# Patient Record
Sex: FEMALE | Race: BLACK OR AFRICAN AMERICAN | NOT HISPANIC OR LATINO | Employment: FULL TIME | URBAN - METROPOLITAN AREA
[De-identification: names, ages, dates, MRNs, and addresses within clinical notes are randomized per-mention and may not be internally consistent; named-entity substitution may affect disease eponyms.]

---

## 2022-04-22 ENCOUNTER — APPOINTMENT (OUTPATIENT)
Dept: RADIOLOGY | Facility: CLINIC | Age: 27
End: 2022-04-22
Payer: COMMERCIAL

## 2022-04-22 ENCOUNTER — HOSPITAL ENCOUNTER (EMERGENCY)
Facility: CLINIC | Age: 27
Discharge: HOME | End: 2022-04-22
Admitting: EMERGENCY MEDICINE
Payer: COMMERCIAL

## 2022-04-22 VITALS
TEMPERATURE: 98.9 F | DIASTOLIC BLOOD PRESSURE: 60 MMHG | HEART RATE: 61 BPM | HEIGHT: 69 IN | RESPIRATION RATE: 17 BRPM | OXYGEN SATURATION: 99 % | SYSTOLIC BLOOD PRESSURE: 127 MMHG | BODY MASS INDEX: 31.1 KG/M2 | WEIGHT: 210 LBS

## 2022-04-22 DIAGNOSIS — R07.89 CHEST PRESSURE: ICD-10-CM

## 2022-04-22 LAB
ANION GAP SERPL CALCULATED.3IONS-SCNC: 12 MMOL/L (ref 5–18)
ATRIAL RATE - MUSE: 64 BPM
BASOPHILS # BLD AUTO: 0 10E3/UL (ref 0–0.2)
BASOPHILS NFR BLD AUTO: 0 %
BUN SERPL-MCNC: 10 MG/DL (ref 8–22)
CALCIUM SERPL-MCNC: 9.4 MG/DL (ref 8.5–10.5)
CHLORIDE BLD-SCNC: 106 MMOL/L (ref 98–107)
CO2 SERPL-SCNC: 25 MMOL/L (ref 22–31)
CREAT SERPL-MCNC: 0.75 MG/DL (ref 0.6–1.1)
DIASTOLIC BLOOD PRESSURE - MUSE: NORMAL MMHG
EOSINOPHIL # BLD AUTO: 0.1 10E3/UL (ref 0–0.7)
EOSINOPHIL NFR BLD AUTO: 1 %
ERYTHROCYTE [DISTWIDTH] IN BLOOD BY AUTOMATED COUNT: 11.5 % (ref 10–15)
GFR SERPL CREATININE-BSD FRML MDRD: >90 ML/MIN/1.73M2
GLUCOSE BLD-MCNC: 88 MG/DL (ref 70–125)
HCG UR QL: NEGATIVE
HCT VFR BLD AUTO: 41.5 % (ref 35–47)
HGB BLD-MCNC: 14.3 G/DL (ref 11.7–15.7)
IMM GRANULOCYTES # BLD: 0 10E3/UL
IMM GRANULOCYTES NFR BLD: 0 %
INTERPRETATION ECG - MUSE: NORMAL
LYMPHOCYTES # BLD AUTO: 2.2 10E3/UL (ref 0.8–5.3)
LYMPHOCYTES NFR BLD AUTO: 25 %
MAGNESIUM SERPL-MCNC: 1.8 MG/DL (ref 1.8–2.6)
MCH RBC QN AUTO: 30.1 PG (ref 26.5–33)
MCHC RBC AUTO-ENTMCNC: 34.5 G/DL (ref 31.5–36.5)
MCV RBC AUTO: 87 FL (ref 78–100)
MONOCYTES # BLD AUTO: 0.6 10E3/UL (ref 0–1.3)
MONOCYTES NFR BLD AUTO: 6 %
NEUTROPHILS # BLD AUTO: 6.1 10E3/UL (ref 1.6–8.3)
NEUTROPHILS NFR BLD AUTO: 68 %
NRBC # BLD AUTO: 0 10E3/UL
NRBC BLD AUTO-RTO: 0 /100
P AXIS - MUSE: 20 DEGREES
PLATELET # BLD AUTO: 373 10E3/UL (ref 150–450)
POTASSIUM BLD-SCNC: 4.2 MMOL/L (ref 3.5–5)
PR INTERVAL - MUSE: 140 MS
QRS DURATION - MUSE: 92 MS
QT - MUSE: 408 MS
QTC - MUSE: 420 MS
R AXIS - MUSE: 20 DEGREES
RBC # BLD AUTO: 4.75 10E6/UL (ref 3.8–5.2)
SODIUM SERPL-SCNC: 143 MMOL/L (ref 136–145)
SYSTOLIC BLOOD PRESSURE - MUSE: NORMAL MMHG
T AXIS - MUSE: 32 DEGREES
TROPONIN I SERPL-MCNC: <0.01 NG/ML (ref 0–0.29)
TSH SERPL DL<=0.005 MIU/L-ACNC: 0.87 UIU/ML (ref 0.3–5)
VENTRICULAR RATE- MUSE: 64 BPM
WBC # BLD AUTO: 9.1 10E3/UL (ref 4–11)

## 2022-04-22 PROCEDURE — 80048 BASIC METABOLIC PNL TOTAL CA: CPT | Performed by: EMERGENCY MEDICINE

## 2022-04-22 PROCEDURE — 84484 ASSAY OF TROPONIN QUANT: CPT | Performed by: EMERGENCY MEDICINE

## 2022-04-22 PROCEDURE — 84443 ASSAY THYROID STIM HORMONE: CPT | Performed by: EMERGENCY MEDICINE

## 2022-04-22 PROCEDURE — 83735 ASSAY OF MAGNESIUM: CPT | Performed by: EMERGENCY MEDICINE

## 2022-04-22 PROCEDURE — 85025 COMPLETE CBC W/AUTO DIFF WBC: CPT | Performed by: EMERGENCY MEDICINE

## 2022-04-22 PROCEDURE — 99285 EMERGENCY DEPT VISIT HI MDM: CPT | Mod: 25

## 2022-04-22 PROCEDURE — 71046 X-RAY EXAM CHEST 2 VIEWS: CPT

## 2022-04-22 PROCEDURE — 36415 COLL VENOUS BLD VENIPUNCTURE: CPT | Performed by: EMERGENCY MEDICINE

## 2022-04-22 PROCEDURE — 93005 ELECTROCARDIOGRAM TRACING: CPT | Performed by: EMERGENCY MEDICINE

## 2022-04-22 PROCEDURE — 81025 URINE PREGNANCY TEST: CPT | Performed by: EMERGENCY MEDICINE

## 2022-04-22 ASSESSMENT — ENCOUNTER SYMPTOMS
VOMITING: 0
CHILLS: 0
CHEST TIGHTNESS: 1
SHORTNESS OF BREATH: 1
LIGHT-HEADEDNESS: 1
COUGH: 0
FEVER: 0
ABDOMINAL PAIN: 0
NAUSEA: 0

## 2022-04-22 NOTE — ED TRIAGE NOTES
"Arrives to ED with c/o chest pressure that began at 2200 last night. Reports heart rate \"jumping around\" between 50s and 120s. Reports mild SOB. Denies cardiac hx. +nausea. Denies emesis.   "

## 2022-04-22 NOTE — ED PROVIDER NOTES
EMERGENCY DEPARTMENT ENCOUNTER      NAME: Naila Bill  AGE: 26 year old female  YOB: 1995  MRN: [id number]  EVALUATION DATE & TIME: 4/22/2022  4:26 PM    PCP: No Ref-Primary, Physician    ED PROVIDER: Juli Kauffman PA-C      Chief Complaint   Patient presents with     Chest Pain         FINAL IMPRESSION:  1. Chest pressure          ED COURSE & MEDICAL DECISION MAKING:    Pertinent Labs & Imaging studies reviewed. (See chart for details)    26 year old female presents to the Emergency Department for evaluation of chest pain.    Physical exam is remarkable for well-appearing female who is in no acute distress.  Heart and lung sounds are clear diffusely throughout.  Abdomen is soft and nontender.  Vital signs are stable and she is afebrile.    CBC is unremarkable with no leukocytosis or anemia.  BMP is unremarkable with no significant electrolyte derangements, normal kidney function.  Magnesium is within normal limits.  TSH within normal limits.  Troponin is negative, EKG without acute ischemic changes.  Pregnancy test is negative.  Chest x-ray is normal.    I do not think any further emergent labs or imaging are indicated at this time.  The patient has been having constant chest pain since yesterday so I think a single troponin and nonischemic EKG is adequate to rule out ACS in patient with no cardiac risk factors.  She is PERC negative so I do not think evaluation for PE is indicated.  She has no ripping or tearing pain to suggest dissection.  The cause of her chest pain is not clear at this time, recommend follow-up with primary care.  She does not have a PCP established so I placed a referral.  Advised return here for any new or worsening symptoms.  The patient is agreeable with this treatment plan and verbalized her understanding.    ED Course   5:19 PM Performed my initial history and physical exam. Discussed workup in the emergency department, management of symptoms, and likely disposition.    6:50 PM Updated with test results. I discussed the plan for discharge with the patient, and patient is agreeable. We discussed supportive cares at home and reasons for return to the ER including new or worsening symptoms - all questions and concerns addressed. Patient to be discharged by RN.    At the conclusion of the encounter I discussed the results of all of the tests and the disposition. The questions were answered. The patient or family acknowledged understanding and was agreeable with the care plan.     Voice recognition software was used in the creation of this note. Any grammatical or nonsensical errors are due to inherent errors with the software and are not the intention of the writer.     MEDICATIONS GIVEN IN THE EMERGENCY:  Medications - No data to display    NEW PRESCRIPTIONS STARTED AT TODAY'S ER VISIT  Discharge Medication List as of 4/22/2022  7:03 PM               =================================================================    HPI    Patient information was obtained from: Patient    Use of : N/A        Naila Bill is a 26 year old female who presents to the ED via walk-in with  for evaluation of chest pain.    The patient states that since last night, she has had a constant chest pressure in the center of her chest.  She does not identify any aggravating or alleviating factors.  At times, she states it is difficult to get a full/deep breath.  She has never had symptoms like this before.  She also endorses lightheadedness.  She denies any personal cardiac history but states her father does have a history of heart attack.  She has no personal history of DVT or PE, no estrogen-containing medications.  Her and her  are actively trying to get pregnant and they believe they may have suffered a miscarriage 2 weeks ago but never took a pregnancy test.  She denies any nausea, vomiting, syncope, fevers, chills, cough, or abdominal pain. She does have a history of anxiety but  "states these symptoms are not typical of her usual anxiety symptoms.      REVIEW OF SYSTEMS   Review of Systems   Constitutional: Negative for chills and fever.   Respiratory: Positive for chest tightness and shortness of breath. Negative for cough.    Cardiovascular: Positive for chest pain.   Gastrointestinal: Negative for abdominal pain, nausea and vomiting.   Neurological: Positive for light-headedness. Negative for syncope.     All other systems reviewed and are negative unless noted in HPI.      PAST MEDICAL HISTORY:  No past medical history on file.    PAST SURGICAL HISTORY:  No past surgical history on file.    CURRENT MEDICATIONS:    UNKNOWN TO PATIENT        ALLERGIES:  Allergies   Allergen Reactions     Bee Pollen Anaphylaxis       FAMILY HISTORY:  No family history on file.    SOCIAL HISTORY:   Social History     Socioeconomic History     Marital status: Single   Tobacco Use     Smoking status: Never Smoker   Substance and Sexual Activity     Alcohol use: No     Comment: 8/9/13 hf      Drug use: No     Comment: 8/9/13 hf      Sexual activity: Never     Partners: Male     Comment: 8/9/13 hf        VITALS:  Patient Vitals for the past 24 hrs:   BP Temp Temp src Pulse Resp SpO2 Height Weight   04/22/22 1900 127/60 -- -- 61 -- 99 % -- --   04/22/22 1845 (!) 147/69 -- -- 76 -- 98 % -- --   04/22/22 1745 (!) 145/75 -- -- 71 17 98 % -- --   04/22/22 1740 (!) 140/87 -- -- 72 16 99 % -- --   04/22/22 1624 (!) 164/95 98.9  F (37.2  C) Temporal 83 16 98 % 1.753 m (5' 9\") 95.3 kg (210 lb)       PHYSICAL EXAM    VITAL SIGNS: /60   Pulse 61   Temp 98.9  F (37.2  C) (Temporal)   Resp 17   Ht 1.753 m (5' 9\")   Wt 95.3 kg (210 lb)   LMP 04/10/2022   SpO2 99%   BMI 31.01 kg/m    General Appearance: Alert, cooperative, normal speech and facial symmetry, appears stated age, the patient does not appear in distress  Head:  Normocephalic, without obvious abnormality, atraumatic  Eyes: Conjunctiva/corneas clear, " EOM's intact, no nystagmus, PERRL  ENT:  Lips, mucosa, and tongue normal; teeth and gums normal, no pharyngeal inflammation, no dysphonia or difficulty swallowing, membranes are moist without pallor  Chest:  No tenderness or deformity  Cardio:  Regular rate and rhythm, S1 and S2 normal, no murmur, rub    or gallop, 2+ pulses symmetric in all extremities  Pulm:  Clear to auscultation bilaterally, respirations unlabored with no accessory muscle use  Abdomen:  Abdomen is soft, non-distended with no tenderness to palpation, rebound tenderness, or guarding.   Extremities: No calf tenderness; Extremities normal, there is no tenderness to palpation , atraumatic, no cyanosis or edema, full function and range of motion, pulses equal in all extremities, normal cap refill, no joint swelling  Neuro: Patient is awake, alert, and responsive to voice. No gross motor weaknesses or sensory loss; moves all extremities.     LAB:  All pertinent labs reviewed and interpreted.  Labs Ordered and Resulted from Time of ED Arrival to Time of ED Departure   BASIC METABOLIC PANEL - Normal       Result Value    Sodium 143      Potassium 4.2      Chloride 106      Carbon Dioxide (CO2) 25      Anion Gap 12      Urea Nitrogen 10      Creatinine 0.75      Calcium 9.4      Glucose 88      GFR Estimate >90     TROPONIN I - Normal    Troponin I <0.01     MAGNESIUM - Normal    Magnesium 1.8     TSH WITH FREE T4 REFLEX - Normal    TSH 0.87     HCG QUALITATIVE URINE - Normal    hCG Urine Qualitative Negative     CBC WITH PLATELETS AND DIFFERENTIAL    WBC Count 9.1      RBC Count 4.75      Hemoglobin 14.3      Hematocrit 41.5      MCV 87      MCH 30.1      MCHC 34.5      RDW 11.5      Platelet Count 373      % Neutrophils 68      % Lymphocytes 25      % Monocytes 6      % Eosinophils 1      % Basophils 0      % Immature Granulocytes 0      NRBCs per 100 WBC 0      Absolute Neutrophils 6.1      Absolute Lymphocytes 2.2      Absolute Monocytes 0.6       Absolute Eosinophils 0.1      Absolute Basophils 0.0      Absolute Immature Granulocytes 0.0      Absolute NRBCs 0.0         RADIOLOGY:  Reviewed all pertinent imaging. Please see official radiology report.  XR Chest 2 Views   Final Result   IMPRESSION: Lungs are clear. No pleural effusion. Heart size and pulmonary vascularity within normal limits.          EKG:    Performed at: 18:07    I have independently reviewed and interpreted the EKG, along with the final read. EKG also reviewed by Dr. Everett.    Ventricular rate 64 bpm  DE interval 140 ms  QRS duration 92 ms  QT/QTc 408/420 ms  P-R-T axes 20 20 32    Impression: NSR    Juli Kauffman PA-C  Emergency Medicine  [facility] EMERGENCY ROOM  [address] [facility] [address] [phone]  Dept: [phone]       Juli Kauffman PA-C  04/22/22 1918

## 2022-04-23 NOTE — DISCHARGE INSTRUCTIONS
You are seen in the emergency department today for evaluation of chest pain.  Your lab work today is reassuring with no evidence of infection in your blood, electrolyte problems, heart attack, or thyroid dysfunction.  Your chest x-ray is normal.    You may take Tylenol and ibuprofen for pain/fever, do not exceed 4000 mg of Tylenol per day or 3200 mg ofibuprofen per day.    I placed a referral to primary care for you, please follow-up with them regarding your symptoms.  Return here for any new or worsening symptoms like worsening chest pain, difficulty breathing, coughing up blood, passing out, or any other symptoms that concern you.

## 2022-07-10 ENCOUNTER — HEALTH MAINTENANCE LETTER (OUTPATIENT)
Age: 27
End: 2022-07-10

## 2022-09-10 ENCOUNTER — HEALTH MAINTENANCE LETTER (OUTPATIENT)
Age: 27
End: 2022-09-10

## 2023-04-20 ENCOUNTER — OFFICE VISIT (OUTPATIENT)
Dept: URGENT CARE | Facility: URGENT CARE | Age: 28
End: 2023-04-20
Payer: COMMERCIAL

## 2023-04-20 VITALS
RESPIRATION RATE: 18 BRPM | WEIGHT: 246 LBS | DIASTOLIC BLOOD PRESSURE: 74 MMHG | SYSTOLIC BLOOD PRESSURE: 110 MMHG | HEART RATE: 73 BPM | OXYGEN SATURATION: 98 % | HEIGHT: 69 IN | BODY MASS INDEX: 36.43 KG/M2 | TEMPERATURE: 97.3 F

## 2023-04-20 PROCEDURE — 99202 OFFICE O/P NEW SF 15 MIN: CPT | Performed by: PHYSICIAN ASSISTANT

## 2023-04-20 NOTE — PROGRESS NOTES
"  Assessment & Plan:        ICD-10-CM    1.  wound disruption  O90.0             Plan/Clinical Decision Making:    Patient with well healed  scar from December with recent superficial 1/2 cm opening at  scar.   Used steri-strips to bandage and can continue to help with healing.       Return if symptoms worsen or fail to improve, for in 7-10 days.     At the end of the encounter, I discussed results, diagnosis, medications. Discussed red flags for immediate return to clinic/ER, as well as indications for follow up if no improvement. Patient understood and agreed to plan. Patient was stable for discharge.        Magda Valdovinos PA-C on 2023 at 6:57 PM          Subjective:     HPI:    Naila is a 27 year old female who presents to clinic today for the following health issues:  Chief Complaint   Patient presents with     Urgent Care     Derm Problem     Hole in  scar since yesterday. Had  back in December, has been coming and going since then.      HPI    Patient noticed small opening of her  scar.   Very slight drainage. No purulent drainage, no surrounding redness.   Has lost weight since , no weight gain.     History obtained from the patient.    Review of Systems   Constitutional: Negative for fever.   Skin: Positive for wound.       Patient Active Problem List   Diagnosis     MENTAL HEALTH        No past medical history on file.    Social History     Tobacco Use     Smoking status: Never     Smokeless tobacco: Not on file   Vaping Use     Vaping status: Not on file   Substance Use Topics     Alcohol use: No     Comment: 13 hf              Objective:     Vitals:    23 1828   BP: 110/74   Pulse: 73   Resp: 18   Temp: 97.3  F (36.3  C)   TempSrc: Temporal   SpO2: 98%   Weight: 111.6 kg (246 lb)   Height: 1.753 m (5' 9\")         Physical Exam   EXAM:   Pleasant, alert, appropriate appearance. NAD.  Head Exam: Normocephalic, atraumatic.  ABD: " soft, Non-tender,  scar well healed. Left aspect of  scar has small area of opening. Superficial. Slight weeping, but no purulent drainage.       Results:  No results found for any visits on 23.

## 2023-04-26 ASSESSMENT — ENCOUNTER SYMPTOMS
WOUND: 1
FEVER: 0

## 2023-07-23 ENCOUNTER — HEALTH MAINTENANCE LETTER (OUTPATIENT)
Age: 28
End: 2023-07-23

## 2023-10-18 ENCOUNTER — HOSPITAL ENCOUNTER (EMERGENCY)
Facility: CLINIC | Age: 28
Discharge: HOME | End: 2023-10-18
Admitting: EMERGENCY MEDICINE
Payer: COMMERCIAL

## 2023-10-18 ENCOUNTER — APPOINTMENT (OUTPATIENT)
Dept: CT IMAGING | Facility: CLINIC | Age: 28
End: 2023-10-18
Payer: COMMERCIAL

## 2023-10-18 VITALS
WEIGHT: 240 LBS | BODY MASS INDEX: 35.55 KG/M2 | HEIGHT: 69 IN | TEMPERATURE: 98.4 F | RESPIRATION RATE: 20 BRPM | SYSTOLIC BLOOD PRESSURE: 133 MMHG | OXYGEN SATURATION: 97 % | DIASTOLIC BLOOD PRESSURE: 68 MMHG | HEART RATE: 86 BPM

## 2023-10-18 DIAGNOSIS — J06.9 VIRAL URI WITH COUGH: ICD-10-CM

## 2023-10-18 DIAGNOSIS — R06.02 SHORTNESS OF BREATH: ICD-10-CM

## 2023-10-18 DIAGNOSIS — R91.8 GROUND GLASS OPACITY PRESENT ON IMAGING OF LUNG: ICD-10-CM

## 2023-10-18 LAB
ANION GAP SERPL CALCULATED.3IONS-SCNC: 8 MMOL/L (ref 7–15)
ATRIAL RATE - MUSE: 88 BPM
BUN SERPL-MCNC: 7.4 MG/DL (ref 6–20)
CALCIUM SERPL-MCNC: 9.9 MG/DL (ref 8.6–10)
CHLORIDE SERPL-SCNC: 107 MMOL/L (ref 98–107)
CREAT SERPL-MCNC: 0.78 MG/DL (ref 0.51–0.95)
D DIMER PPP FEU-MCNC: 0.51 UG/ML FEU (ref 0–0.5)
DEPRECATED HCO3 PLAS-SCNC: 24 MMOL/L (ref 22–29)
DIASTOLIC BLOOD PRESSURE - MUSE: NORMAL MMHG
EGFRCR SERPLBLD CKD-EPI 2021: >90 ML/MIN/1.73M2
ERYTHROCYTE [DISTWIDTH] IN BLOOD BY AUTOMATED COUNT: 12.6 % (ref 10–15)
FLUAV RNA SPEC QL NAA+PROBE: NEGATIVE
FLUBV RNA RESP QL NAA+PROBE: NEGATIVE
GLUCOSE SERPL-MCNC: 99 MG/DL (ref 70–99)
HCG SERPL QL: NEGATIVE
HCT VFR BLD AUTO: 40 % (ref 35–47)
HGB BLD-MCNC: 13.6 G/DL (ref 11.7–15.7)
HOLD SPECIMEN: NORMAL
INTERPRETATION ECG - MUSE: NORMAL
LACTATE SERPL-SCNC: 1.2 MMOL/L (ref 0.7–2)
MCH RBC QN AUTO: 30 PG (ref 26.5–33)
MCHC RBC AUTO-ENTMCNC: 34 G/DL (ref 31.5–36.5)
MCV RBC AUTO: 88 FL (ref 78–100)
NT-PROBNP SERPL-MCNC: <36 PG/ML (ref 0–450)
P AXIS - MUSE: 24 DEGREES
PLATELET # BLD AUTO: 311 10E3/UL (ref 150–450)
POTASSIUM SERPL-SCNC: 4 MMOL/L (ref 3.4–5.3)
PR INTERVAL - MUSE: 130 MS
PROCALCITONIN SERPL IA-MCNC: 0.06 NG/ML
QRS DURATION - MUSE: 86 MS
QT - MUSE: 362 MS
QTC - MUSE: 438 MS
R AXIS - MUSE: -4 DEGREES
RBC # BLD AUTO: 4.53 10E6/UL (ref 3.8–5.2)
RSV RNA SPEC NAA+PROBE: NEGATIVE
SARS-COV-2 RNA RESP QL NAA+PROBE: NEGATIVE
SODIUM SERPL-SCNC: 139 MMOL/L (ref 135–145)
SYSTOLIC BLOOD PRESSURE - MUSE: NORMAL MMHG
T AXIS - MUSE: 30 DEGREES
TROPONIN T SERPL HS-MCNC: <6 NG/L
VENTRICULAR RATE- MUSE: 88 BPM
WBC # BLD AUTO: 8.2 10E3/UL (ref 4–11)

## 2023-10-18 PROCEDURE — 83605 ASSAY OF LACTIC ACID: CPT

## 2023-10-18 PROCEDURE — 250N000011 HC RX IP 250 OP 636: Mod: JZ

## 2023-10-18 PROCEDURE — 258N000003 HC RX IP 258 OP 636

## 2023-10-18 PROCEDURE — 93005 ELECTROCARDIOGRAM TRACING: CPT

## 2023-10-18 PROCEDURE — 85027 COMPLETE CBC AUTOMATED: CPT

## 2023-10-18 PROCEDURE — 84145 PROCALCITONIN (PCT): CPT

## 2023-10-18 PROCEDURE — 83880 ASSAY OF NATRIURETIC PEPTIDE: CPT

## 2023-10-18 PROCEDURE — 80048 BASIC METABOLIC PNL TOTAL CA: CPT

## 2023-10-18 PROCEDURE — 36415 COLL VENOUS BLD VENIPUNCTURE: CPT

## 2023-10-18 PROCEDURE — 84484 ASSAY OF TROPONIN QUANT: CPT

## 2023-10-18 PROCEDURE — 84703 CHORIONIC GONADOTROPIN ASSAY: CPT

## 2023-10-18 PROCEDURE — 99285 EMERGENCY DEPT VISIT HI MDM: CPT | Mod: 25

## 2023-10-18 PROCEDURE — 87637 SARSCOV2&INF A&B&RSV AMP PRB: CPT

## 2023-10-18 PROCEDURE — 85379 FIBRIN DEGRADATION QUANT: CPT

## 2023-10-18 PROCEDURE — 96374 THER/PROPH/DIAG INJ IV PUSH: CPT | Mod: 59

## 2023-10-18 PROCEDURE — 71275 CT ANGIOGRAPHY CHEST: CPT

## 2023-10-18 PROCEDURE — 96361 HYDRATE IV INFUSION ADD-ON: CPT

## 2023-10-18 RX ORDER — BENZONATATE 100 MG/1
100 CAPSULE ORAL 3 TIMES DAILY PRN
Qty: 30 CAPSULE | Refills: 0 | Status: SHIPPED | OUTPATIENT
Start: 2023-10-18 | End: 2024-06-10

## 2023-10-18 RX ORDER — METHYLPREDNISOLONE 4 MG
TABLET, DOSE PACK ORAL
Qty: 21 TABLET | Refills: 0 | Status: SHIPPED | OUTPATIENT
Start: 2023-10-18 | End: 2024-06-10

## 2023-10-18 RX ORDER — IOPAMIDOL 755 MG/ML
75 INJECTION, SOLUTION INTRAVASCULAR ONCE
Status: COMPLETED | OUTPATIENT
Start: 2023-10-18 | End: 2023-10-18

## 2023-10-18 RX ORDER — METHYLPREDNISOLONE SODIUM SUCCINATE 125 MG/2ML
125 INJECTION, POWDER, LYOPHILIZED, FOR SOLUTION INTRAMUSCULAR; INTRAVENOUS ONCE
Status: COMPLETED | OUTPATIENT
Start: 2023-10-18 | End: 2023-10-18

## 2023-10-18 RX ADMIN — SODIUM CHLORIDE 1000 ML: 9 INJECTION, SOLUTION INTRAVENOUS at 10:10

## 2023-10-18 RX ADMIN — METHYLPREDNISOLONE SODIUM SUCCINATE 125 MG: 125 INJECTION, POWDER, FOR SOLUTION INTRAMUSCULAR; INTRAVENOUS at 10:53

## 2023-10-18 RX ADMIN — IOPAMIDOL 75 ML: 755 INJECTION, SOLUTION INTRAVENOUS at 11:53

## 2023-10-18 ASSESSMENT — ENCOUNTER SYMPTOMS
NAUSEA: 1
CONSTIPATION: 0
LIGHT-HEADEDNESS: 1
NUMBNESS: 0
FATIGUE: 1
DYSURIA: 0
CHEST TIGHTNESS: 1
CHILLS: 1
WEAKNESS: 0
SORE THROAT: 0
COUGH: 1
DIARRHEA: 0
DIZZINESS: 1
FEVER: 1
BACK PAIN: 0
VOMITING: 1
SHORTNESS OF BREATH: 1
HEADACHES: 1
ABDOMINAL PAIN: 0

## 2023-10-18 ASSESSMENT — ACTIVITIES OF DAILY LIVING (ADL)
ADLS_ACUITY_SCORE: 35
ADLS_ACUITY_SCORE: 35

## 2023-10-18 NOTE — ED PROVIDER NOTES
EMERGENCY DEPARTMENT ENCOUNTER      NAME: Naila Wilson  AGE: 27 year old female  YOB: 1995  MRN: [id number]  EVALUATION DATE & TIME: No admission date for patient encounter.    PCP: No Ref-Primary, Physician    ED PROVIDER: Jeni Berumen PA-C    Chief Complaint   Patient presents with    Cough    Dizziness     FINAL IMPRESSION:  1. Shortness of breath    2. Viral URI with cough    3. Ground glass opacity present on imaging of lung      MEDICAL DECISION MAKING:    Pertinent Labs & Imaging studies reviewed. (See chart for details)  Naila Wilson is a 27 year old female who presents for evaluation of cough and shortness of breath. Patient reports since Saturday evening (4 days ago), has been experiencing worsening cough, chest pressure and shortness of breath. Was seen at an outside urgent care on Monday and prescribed albuterol inhaler, was tested for COVID, influenza and RSV was negative at that time.  Chest x-ray was normal as well.  Since then, has had worsening symptoms, reports she started feeling very short of breath with walking up a few flights of stairs today prompting ER evaluation.  Has chest tightness with coughing.  Associated nausea with an episode of vomiting, associated fever and chills.  Denies known sick contacts.  Denies history of asthma.  Denies throat pain, abdominal pain, diarrhea, constipation, chance of pregnancy, urinary symptoms.  Denies history of PE, recent travel or surgeries..     On my initial evaluation, patient hypertensive, remainder of vital signs normal. On physical exam patient is awake, alert, no acute distress, resting comfortably in bed.  Does not appear overly uncomfortable, ill or toxic.  No increased work of breathing or respiratory distress.  Heart sounds are normal, lungs with bilateral expiratory wheezing, no crackles.  No abdominal tenderness on palpation.  No lower extremity edema bilaterally..    Differential diagnosis includes COVID, influenza,  RSV, other viral URI, bronchitis, pneumonia, pleural effusion, pneumothorax, ACS, PE, heart failure exacerbation, asthma exacerbation, pericarditis, myocarditis. Emergency department workup included basic labs in addition to troponin, D-dimer, EKG, procalcitonin, BNP. Patient was given IV fluids and IV methylprednisolone with some improvement in symptoms.    Labs and imaging independently interpreted by me. EKG without T wave inversion, ST elevation or ST depression, troponin <6. Symptoms for 5 days, very low suspicion for ACS.  No leukocytosis, normal lactate, low suspicion for infectious etiology or sepsis. Very mildly elevated d-dimer, but will unfortunately have to obtain a CT PE study.  This was done and was gated for PE.  Does show bronchial wall thickening, this is likely consistent with bronchitis.  No signs of pneumonia.  Does show groundglass opacities with cavitary lesions. Discussed findings of CT scan with patient and recommended outpatient repeat CT scan for further evaluation.  Patient is afebrile, normal white count and normal lactate, very low suspicion for septic emboli.  She is otherwise clinically well-appearing and vitally stable.  BNP is normal, low suspicion for acute CHF exacerbation.  Procalcitonin is normal, low suspicion for pneumonia.    Suspect symptoms due to bronchitis.  We will treat with Medrol Dosepak and Tessalon Perles.  No indication for antibiotic treatment at this time.  Patient is otherwise clinically well-appearing and vitally stable, low suspicion for any emergent process that would require further ER intervention or hospital admission.  Provided expectant management as well as strict return precautions.    Patient has had serial examinations and notes significant improvement.     Patient was discharged in stable condition with treatment plan as below. Instructed to follow up with primary care provider in 3 days and return to the emergency department with any new or  worsening of symptoms. Patient expressed understanding, feels comfortable, and is in agreement with this plan. All questions addressed prior to discharge.    Medical Decision Making    History:  Supplemental history from: Documented in chart, if applicable and Family Member/Significant Other  External Record(s) reviewed: Documented in chart, if applicable. and Outpatient Record:      Work Up:  Chart documentation includes differential considered and any EKGs or imaging independently interpreted by provider, where specified.  In additional to work up documented, I considered the following work up: Documented in chart, if applicable.    External consultation:  Discussion of management with another provider: Documented in chart, if applicable    Complicating factors:  Care impacted by chronic illness: N/A  Care affected by social determinants of health: N/A    Disposition considerations: Discharge. I prescribed additional prescription strength medication(s) as charted. N/A.    ED COURSE:  9:28 AM  I reviewed the patient's chart. I met with the patient to gather history and to perform my initial exam.   12:26 PM discussed with Dr. Rausch regarding CT results.   12:26 PM  I rechecked the patient and updated her on results. We discussed plan for discharge including treatment plan, follow-up and return precautions to emergency department.  Patient voiced understanding and in agreement with this plan.    At the conclusion of the encounter I discussed the results of all of the tests and the disposition. The questions were answered. The patient or family acknowledged understanding and was agreeable with the care plan.     Voice recognition software was used in the creation of this note. Any grammatical or nonsensical errors are due to inherent errors with the software and are not the intention of the writer.     MEDICATIONS GIVEN IN THE EMERGENCY:  Medications   methylPREDNISolone sodium succinate (solu-MEDROL) injection 125  mg (125 mg Intravenous $Given 10/18/23 1053)   sodium chloride 0.9% BOLUS 1,000 mL (0 mLs Intravenous Stopped 10/18/23 1136)   iopamidol (ISOVUE-370) solution 75 mL (75 mLs Intravenous $Given 10/18/23 1153)       NEW PRESCRIPTIONS STARTED AT TODAY'S ER VISIT  Discharge Medication List as of 10/18/2023 12:46 PM        START taking these medications    Details   benzonatate (TESSALON) 100 MG capsule Take 1 capsule (100 mg) by mouth 3 times daily as needed for cough, Disp-30 capsule, R-0, E-Prescribe      methylPREDNISolone (MEDROL DOSEPAK) 4 MG tablet therapy pack Follow Package Directions, Disp-21 tablet, R-0, E-Prescribe           =================================================================    HPI:    Patient information was obtained from: patient and     Use of Interpretor: N/A       Naila Wilson is a 27 year old female with a pertinent history of HTN,  TIARA, who presents to this ED for evaluation of cough.     Patient reports since Saturday evening (4 days ago), has been experiencing worsening cough, chest pressure and shortness of breath.  Reports feeling lightheaded with coughing episodes.  Was seen at an outside urgent care on Monday and prescribed albuterol inhaler, was tested for COVID, influenza and RSV was negative at that time.  Chest x-ray was normal as well.  Since then, has had worsening symptoms, reports she started feeling very short of breath with walking up a few flights of stairs today prompting ER evaluation.  Has chest tightness with coughing.  Associated nausea with an episode of vomiting, associated fever and chills.  Denies known sick contacts.  Denies history of asthma.  Denies throat pain, abdominal pain, diarrhea, constipation, chance of pregnancy, urinary symptoms.  Denies history of PE, recent travel or surgeries.    REVIEW OF SYSTEMS:  Review of Systems   Constitutional:  Positive for chills, fatigue and fever.   HENT:  Positive for congestion. Negative for sore throat.   "  Respiratory:  Positive for cough, chest tightness and shortness of breath.    Cardiovascular:  Negative for chest pain and leg swelling.   Gastrointestinal:  Positive for nausea and vomiting. Negative for abdominal pain, constipation and diarrhea.   Genitourinary:  Negative for dysuria.   Musculoskeletal:  Negative for back pain.   Neurological:  Positive for dizziness, light-headedness and headaches. Negative for syncope, weakness and numbness.   All other systems reviewed and are negative.     PAST MEDICAL HISTORY:  No past medical history on file.    PAST SURGICAL HISTORY:  No past surgical history on file.    CURRENT MEDICATIONS:    No current facility-administered medications for this encounter.    Current Outpatient Medications:     benzonatate (TESSALON) 100 MG capsule, Take 1 capsule (100 mg) by mouth 3 times daily as needed for cough, Disp: 30 capsule, Rfl: 0    methylPREDNISolone (MEDROL DOSEPAK) 4 MG tablet therapy pack, Follow Package Directions, Disp: 21 tablet, Rfl: 0    UNKNOWN TO PATIENT, Birth control implant lasting 3 years, Disp: , Rfl:     ALLERGIES:  Allergies   Allergen Reactions    Bee Pollen Anaphylaxis       FAMILY HISTORY:  No family history on file.    SOCIAL HISTORY:   Social History     Socioeconomic History    Marital status:    Tobacco Use    Smoking status: Never   Substance and Sexual Activity    Alcohol use: No     Comment: 8/9/13 hf     Drug use: No     Comment: 8/9/13 hf     Sexual activity: Never     Partners: Male     Comment: 8/9/13 hf        VITALS:  Patient Vitals for the past 24 hrs:   BP Temp Temp src Pulse Resp SpO2 Height Weight   10/18/23 1247 -- -- -- 86 -- -- -- --   10/18/23 1245 -- -- -- 75 20 97 % -- --   10/18/23 1130 133/68 -- -- 80 30 98 % -- --   10/18/23 1030 129/73 -- -- 76 (!) 33 97 % -- --   10/18/23 0926 (!) 169/86 98.4  F (36.9  C) Temporal 91 18 97 % 1.753 m (5' 9\") 108.9 kg (240 lb)       PHYSICAL EXAM    Constitutional: Well developed, Well " nourished, NAD  HENT: Normocephalic, Atraumatic, Bilateral external ears normal, Oropharynx normal, mucous membranes moist, Nose normal.   Neck: Normal range of motion, No tenderness, Supple, No stridor.  Eyes: PERRL, EOMI, Conjunctiva normal, No discharge.   Respiratory: Normal breath sounds, No respiratory distress, No wheezing, Speaks full sentences easily. No cough.  Cardiovascular: Normal heart rate, Regular rhythm, No murmurs, No rubs, No gallops. Chest wall nontender.  GI: Soft, No tenderness, No masses, No flank tenderness. No rebound or guarding.  Musculoskeletal: 2+ DP pulses. No edema. No cyanosis, No clubbing. Good range of motion in all major joints. No tenderness to palpation or major deformities noted. No tenderness of the CTLS spine.   Integument: Warm, Dry, No erythema, No rash. No petechiae.  Neurologic: Alert & oriented x 3, Normal motor function, Normal sensory function, No focal deficits noted. Normal gait.  Psychiatric: Affect normal, Judgment normal, Mood normal. Cooperative.    LAB:  All pertinent labs reviewed and interpreted.  Labs Ordered and Resulted from Time of ED Arrival to Time of ED Departure   D DIMER QUANTITATIVE - Abnormal       Result Value    D-Dimer Quantitative 0.51 (*)    PROCALCITONIN - Abnormal    Procalcitonin 0.06 (*)    CBC WITH PLATELETS - Normal    WBC Count 8.2      RBC Count 4.53      Hemoglobin 13.6      Hematocrit 40.0      MCV 88      MCH 30.0      MCHC 34.0      RDW 12.6      Platelet Count 311     BASIC METABOLIC PANEL - Normal    Sodium 139      Potassium 4.0      Chloride 107      Carbon Dioxide (CO2) 24      Anion Gap 8      Urea Nitrogen 7.4      Creatinine 0.78      GFR Estimate >90      Calcium 9.9      Glucose 99     N TERMINAL PRO BNP OUTPATIENT - Normal    N Terminal Pro BNP Outpatient <36     TROPONIN T, HIGH SENSITIVITY - Normal    Troponin T, High Sensitivity <6     LACTIC ACID WHOLE BLOOD - Normal    Lactic Acid 1.2     INFLUENZA A/B, RSV, &  SARS-COV2 PCR - Normal    Influenza A PCR Negative      Influenza B PCR Negative      RSV PCR Negative      SARS CoV2 PCR Negative     HCG QUALITATIVE PREGNANCY - Normal    hCG Serum Qualitative Negative         RADIOLOGY:  Reviewed all pertinent imaging. Please see official radiology report.  CT Chest Pulmonary Embolism w Contrast   Final Result   IMPRESSION:   1.  No pulmonary embolus.   2.  There are several scattered mid and basilar predominant groundglass nodular opacities. A few of these demonstrate central cavitation. The differential is broad but includes atypical infection with sequelae of cavitation/necrosis, septic emboli, or    sequelae of vasculitis. Continued attention on follow-up is recommended to ensure resolution.   3.  Mildly enlarged right hilar lymph node is likely reactive.          EKG:    Performed at: 1000  Rate: 88 bpm  Impression: Sinus rhythm, no T wave inversion, ST elevation or ST depression    I have reviewed and interpreted the EKG(s) documented above along with Dr. Rausch.    PROCEDURES:   None     Diagnosis:  1. Shortness of breath    2. Viral URI with cough    3. Ground glass opacity present on imaging of lung        Jeni Bermuen PA-C  Emergency Medicine  [facility]  10/18/2023       Jeni Berumen PA-C  10/18/23 1311

## 2023-10-18 NOTE — Clinical Note
Naila Wilson was seen and treated in our emergency department on 10/18/2023.  She may return to work on 10/20/2023.       If you have any questions or concerns, please don't hesitate to call.      Jeni Berumen PA-C

## 2023-10-18 NOTE — ED TRIAGE NOTES
Patient has cough, SOB, lightheadedness. Symptoms ongoing x4 days. Seen in clinic and prescribed inhaler which is not helping. No asthma history. Had negative flu, covid, RSV test Monday. Did home COVID test again yesterday.

## 2023-10-18 NOTE — DISCHARGE INSTRUCTIONS
You were seen in the ER for cough and shortness of breath.  You had a CT scan done today that showed inflammation to the bronchi of your lungs that is consistent with likely bronchitis.  No blood clots.  As we discussed, you do have something called groundglass opacities in your lungs that needs follow-up with your primary care provider for repeat CT scan as an outpatient.  The rest of your blood work looks good today.  Please take the Medrol Dosepak as directed to help reduce inflammation and cough.  Use Tessalon Perles up to 3 times a day as needed cough.  Continue to use the albuterol inhalers prescribed at the outside urgent care.  Follow-up with your primary care provider and return to the ER for any new or worsening symptoms.

## 2024-06-10 ENCOUNTER — HOSPITAL ENCOUNTER (EMERGENCY)
Facility: CLINIC | Age: 29
Discharge: HOME | End: 2024-06-10
Attending: INTERNAL MEDICINE | Admitting: INTERNAL MEDICINE

## 2024-06-10 VITALS
SYSTOLIC BLOOD PRESSURE: 148 MMHG | HEART RATE: 94 BPM | WEIGHT: 228.5 LBS | DIASTOLIC BLOOD PRESSURE: 92 MMHG | TEMPERATURE: 98.8 F | OXYGEN SATURATION: 99 % | BODY MASS INDEX: 33.84 KG/M2 | RESPIRATION RATE: 16 BRPM | HEIGHT: 69 IN

## 2024-06-10 DIAGNOSIS — I10 HYPERTENSION: ICD-10-CM

## 2024-06-10 LAB
ALBUMIN SERPL BCG-MCNC: 4 G/DL (ref 3.5–5.2)
ALP SERPL-CCNC: 77 U/L (ref 40–150)
ALT SERPL W P-5'-P-CCNC: 27 U/L (ref 0–50)
ANION GAP SERPL CALCULATED.3IONS-SCNC: 10 MMOL/L (ref 7–15)
AST SERPL W P-5'-P-CCNC: 35 U/L (ref 0–45)
ATRIAL RATE - MUSE: 80 BPM
BASOPHILS # BLD AUTO: 0 10E3/UL (ref 0–0.2)
BASOPHILS NFR BLD AUTO: 0 %
BILIRUB SERPL-MCNC: 0.3 MG/DL
BUN SERPL-MCNC: 7.2 MG/DL (ref 6–20)
CALCIUM SERPL-MCNC: 8.9 MG/DL (ref 8.6–10)
CHLORIDE SERPL-SCNC: 105 MMOL/L (ref 98–107)
CREAT SERPL-MCNC: 0.82 MG/DL (ref 0.51–0.95)
DEPRECATED HCO3 PLAS-SCNC: 21 MMOL/L (ref 22–29)
DIASTOLIC BLOOD PRESSURE - MUSE: NORMAL MMHG
EGFRCR SERPLBLD CKD-EPI 2021: >90 ML/MIN/1.73M2
EOSINOPHIL # BLD AUTO: 0.1 10E3/UL (ref 0–0.7)
EOSINOPHIL NFR BLD AUTO: 1 %
ERYTHROCYTE [DISTWIDTH] IN BLOOD BY AUTOMATED COUNT: 12 % (ref 10–15)
GLUCOSE SERPL-MCNC: 96 MG/DL (ref 70–99)
HCG SERPL QL: NEGATIVE
HCT VFR BLD AUTO: 43.1 % (ref 35–47)
HGB BLD-MCNC: 14.6 G/DL (ref 11.7–15.7)
IMM GRANULOCYTES # BLD: 0 10E3/UL
IMM GRANULOCYTES NFR BLD: 0 %
INTERPRETATION ECG - MUSE: NORMAL
LYMPHOCYTES # BLD AUTO: 1.9 10E3/UL (ref 0.8–5.3)
LYMPHOCYTES NFR BLD AUTO: 19 %
MCH RBC QN AUTO: 30.5 PG (ref 26.5–33)
MCHC RBC AUTO-ENTMCNC: 33.9 G/DL (ref 31.5–36.5)
MCV RBC AUTO: 90 FL (ref 78–100)
MONOCYTES # BLD AUTO: 0.4 10E3/UL (ref 0–1.3)
MONOCYTES NFR BLD AUTO: 4 %
NEUTROPHILS # BLD AUTO: 7.5 10E3/UL (ref 1.6–8.3)
NEUTROPHILS NFR BLD AUTO: 76 %
NRBC # BLD AUTO: 0 10E3/UL
NRBC BLD AUTO-RTO: 0 /100
P AXIS - MUSE: 12 DEGREES
PLATELET # BLD AUTO: 308 10E3/UL (ref 150–450)
POTASSIUM SERPL-SCNC: 4.9 MMOL/L (ref 3.4–5.3)
PR INTERVAL - MUSE: 138 MS
PROT SERPL-MCNC: 6.9 G/DL (ref 6.4–8.3)
QRS DURATION - MUSE: 88 MS
QT - MUSE: 392 MS
QTC - MUSE: 452 MS
R AXIS - MUSE: 0 DEGREES
RBC # BLD AUTO: 4.79 10E6/UL (ref 3.8–5.2)
SODIUM SERPL-SCNC: 136 MMOL/L (ref 135–145)
SYSTOLIC BLOOD PRESSURE - MUSE: NORMAL MMHG
T AXIS - MUSE: 20 DEGREES
VENTRICULAR RATE- MUSE: 80 BPM
WBC # BLD AUTO: 9.9 10E3/UL (ref 4–11)

## 2024-06-10 PROCEDURE — 99284 EMERGENCY DEPT VISIT MOD MDM: CPT | Mod: FS | Performed by: INTERNAL MEDICINE

## 2024-06-10 PROCEDURE — 250N000011 HC RX IP 250 OP 636: Performed by: PHYSICIAN ASSISTANT

## 2024-06-10 PROCEDURE — 250N000013 HC RX MED GY IP 250 OP 250 PS 637: Performed by: PHYSICIAN ASSISTANT

## 2024-06-10 PROCEDURE — 93010 ELECTROCARDIOGRAM REPORT: CPT | Performed by: PHYSICIAN ASSISTANT

## 2024-06-10 PROCEDURE — 96375 TX/PRO/DX INJ NEW DRUG ADDON: CPT | Performed by: INTERNAL MEDICINE

## 2024-06-10 PROCEDURE — 36415 COLL VENOUS BLD VENIPUNCTURE: CPT | Performed by: PHYSICIAN ASSISTANT

## 2024-06-10 PROCEDURE — 93005 ELECTROCARDIOGRAM TRACING: CPT | Performed by: INTERNAL MEDICINE

## 2024-06-10 PROCEDURE — 258N000003 HC RX IP 258 OP 636: Mod: JZ | Performed by: PHYSICIAN ASSISTANT

## 2024-06-10 PROCEDURE — 82247 BILIRUBIN TOTAL: CPT | Performed by: PHYSICIAN ASSISTANT

## 2024-06-10 PROCEDURE — 84450 TRANSFERASE (AST) (SGOT): CPT | Performed by: PHYSICIAN ASSISTANT

## 2024-06-10 PROCEDURE — 99284 EMERGENCY DEPT VISIT MOD MDM: CPT | Mod: 25 | Performed by: INTERNAL MEDICINE

## 2024-06-10 PROCEDURE — 84703 CHORIONIC GONADOTROPIN ASSAY: CPT | Performed by: PHYSICIAN ASSISTANT

## 2024-06-10 PROCEDURE — 96361 HYDRATE IV INFUSION ADD-ON: CPT | Performed by: INTERNAL MEDICINE

## 2024-06-10 PROCEDURE — 96374 THER/PROPH/DIAG INJ IV PUSH: CPT | Performed by: INTERNAL MEDICINE

## 2024-06-10 PROCEDURE — 85025 COMPLETE CBC W/AUTO DIFF WBC: CPT | Performed by: PHYSICIAN ASSISTANT

## 2024-06-10 RX ORDER — ACETAMINOPHEN 500 MG
1000 TABLET ORAL ONCE
Status: COMPLETED | OUTPATIENT
Start: 2024-06-10 | End: 2024-06-10

## 2024-06-10 RX ORDER — DIPHENHYDRAMINE HYDROCHLORIDE 50 MG/ML
25 INJECTION INTRAMUSCULAR; INTRAVENOUS ONCE
Status: COMPLETED | OUTPATIENT
Start: 2024-06-10 | End: 2024-06-10

## 2024-06-10 RX ORDER — SODIUM CHLORIDE 9 MG/ML
INJECTION, SOLUTION INTRAVENOUS
Status: DISCONTINUED
Start: 2024-06-10 | End: 2024-06-10 | Stop reason: HOSPADM

## 2024-06-10 RX ADMIN — ACETAMINOPHEN 1000 MG: 500 TABLET ORAL at 13:19

## 2024-06-10 RX ADMIN — DIPHENHYDRAMINE HYDROCHLORIDE 25 MG: 50 INJECTION INTRAMUSCULAR; INTRAVENOUS at 13:22

## 2024-06-10 RX ADMIN — SODIUM CHLORIDE 500 ML: 9 INJECTION, SOLUTION INTRAVENOUS at 13:19

## 2024-06-10 RX ADMIN — PROCHLORPERAZINE EDISYLATE 10 MG: 5 INJECTION INTRAMUSCULAR; INTRAVENOUS at 13:19

## 2024-06-10 ASSESSMENT — ACTIVITIES OF DAILY LIVING (ADL)
ADLS_ACUITY_SCORE: 35
ADLS_ACUITY_SCORE: 35

## 2024-06-10 ASSESSMENT — COLUMBIA-SUICIDE SEVERITY RATING SCALE - C-SSRS
1. IN THE PAST MONTH, HAVE YOU WISHED YOU WERE DEAD OR WISHED YOU COULD GO TO SLEEP AND NOT WAKE UP?: YES
6. HAVE YOU EVER DONE ANYTHING, STARTED TO DO ANYTHING, OR PREPARED TO DO ANYTHING TO END YOUR LIFE?: YES
2. HAVE YOU ACTUALLY HAD ANY THOUGHTS OF KILLING YOURSELF IN THE PAST MONTH?: NO

## 2024-06-10 ASSESSMENT — ENCOUNTER SYMPTOMS: HYPERTENSION: 1

## 2024-06-10 NOTE — ED NOTES
Pt has decreased pulse in her right foot.   is leaving her and is tearful in triage but not suicidal.

## 2024-06-10 NOTE — DISCHARGE INSTRUCTIONS
Please make an appointment to follow up with Your Primary Care Provider and Primary Care Center (phone: [phone]).  Return to the ER for worsening symptoms..    Can search [email or web address] for sliding scale community clinics while you work on getting your insurance back.

## 2024-06-10 NOTE — ED PROVIDER NOTES
"ED Provider Note  [facility]      History     Chief Complaint   Patient presents with    Hypertension     States @ 0800 this morning she started to not feel so good.  Complains of frontal headache, dizzy, and shaky.  Normal SBP runs 130/       HPI  Naila Wilson is a 28 year old female with a history of chronic hypertension, tobacco use, and anxiety presents to the emergency department due to frontal headache, dizziness, and shakiness beginning at 8 AM this morning.    Past Medical History  No past medical history on file.  No past surgical history on file.  No current outpatient medications on file.    Allergies   Allergen Reactions    Bee Pollen Anaphylaxis     Family History  No family history on file.  Social History   Social History     Tobacco Use    Smoking status: Never   Substance Use Topics    Alcohol use: No     Comment: 8/9/13 hf     Drug use: No     Comment: 8/9/13 hf       A medically appropriate review of systems was performed with pertinent positives and negatives noted in the HPI, and all other systems negative.    Physical Exam   BP: (!) 151/95  Pulse: 89  Temp: 98.8  F (37.1  C)  Resp: 16  Height: 175.3 cm (5' 9\")  Weight: 103.6 kg (228 lb 8 oz)  SpO2: 99 %  Physical Exam  ***    ED Course, Procedures, & Data      Procedures       {ED Course Selections (Optional):350998}  {ED Sepsis CMS Documentation (Optional):050439::\" \"}       No results found for any visits on 06/10/24.  Medications - No data to display  Labs Ordered and Resulted from Time of ED Arrival to Time of ED Departure - No data to display  No orders to display          {Critical Care Performed?:689626}    Assessment & Plan    ***    I have reviewed the nursing notes. I have reviewed the findings, diagnosis, plan and need for follow up with the patient.    New Prescriptions    No medications on file       Final diagnoses:   None       ***  [facility] EMERGENCY DEPARTMENT  6/10/2024  "

## 2024-06-10 NOTE — ED PROVIDER NOTES
"ED Provider Note  [facility]      History     Chief Complaint   Patient presents with    Hypertension     States @ 0800 this morning she started to not feel so good.  Complains of frontal headache, dizzy, and shaky.  Normal SBP runs 130/         Hypertension    27yo F pmht HTN p/w mild HA, diffuse \"tingling,\" dizziness, and feeling shaking since this morning.  Patient has history of gestational hypertension, which has persisted after her pregnancy and postpartum period.  She has been prescribed lisinopril, but states that she does not currently have a PCP 2/2 insurance issues.  She has, however been taking 20 mg of lisinopril daily that was leftover from previous prescription.  She took her blood pressure earlier today, with initial reading 160s SBP, and subsequent 150s SBP.  No abdominal pain, CP, SOB, nausea, vomiting, acute vision changes, extremity weakness.    Past Medical History  No past medical history on file.  No past surgical history on file.  No current outpatient medications on file.    Allergies   Allergen Reactions    Bee Pollen Anaphylaxis     Family History  No family history on file.  Social History   Social History     Tobacco Use    Smoking status: Never   Substance Use Topics    Alcohol use: No     Comment: 8/9/13 hf     Drug use: No     Comment: 8/9/13 hf       A medically appropriate review of systems was performed with pertinent positives and negatives noted in the HPI, and all other systems negative.    Physical Exam   BP: (!) 151/95  Pulse: 89  Temp: 98.8  F (37.1  C)  Resp: 16  Height: 175.3 cm (5' 9\")  Weight: 103.6 kg (228 lb 8 oz)  SpO2: 99 %  Physical Exam  Constitutional:       General: She is not in acute distress.     Appearance: Normal appearance. She is not diaphoretic.   HENT:      Head: Atraumatic.      Mouth/Throat:      Mouth: Mucous membranes are moist.   Eyes:      Conjunctiva/sclera: Conjunctivae normal.   Cardiovascular:      Rate and Rhythm: " Normal rate and regular rhythm.      Heart sounds: Normal heart sounds.   Pulmonary:      Effort: Pulmonary effort is normal.      Breath sounds: Normal breath sounds.   Musculoskeletal:      Cervical back: Neck supple.   Skin:     General: Skin is warm.   Neurological:      General: No focal deficit present.      Mental Status: She is alert and oriented to person, place, and time.      Cranial Nerves: Cranial nerves 2-12 are intact.      Motor: Motor function is intact. No weakness.      Coordination: Coordination is intact. Finger-Nose-Finger Test normal.           ED Course, Procedures, & Data      Procedures            EKG Interpretation:      Interpreted by Riley Rossi PA-C  Time reviewed: 1325  Symptoms at time of EKG: None   Rhythm: normal sinus   Rate: Normal  Axis: Normal  Ectopy: none  Conduction: normal  ST Segments/ T Waves: T wave inversion aVR and V1  Q Waves: none  Comparison to prior: Unchanged    Clinical Impression: normal EKG                 Results for orders placed or performed during the hospital encounter of 06/10/24   Comprehensive metabolic panel     Status: Abnormal   Result Value Ref Range    Sodium 136 135 - 145 mmol/L    Potassium 4.9 3.4 - 5.3 mmol/L    Carbon Dioxide (CO2) 21 (L) 22 - 29 mmol/L    Anion Gap 10 7 - 15 mmol/L    Urea Nitrogen 7.2 6.0 - 20.0 mg/dL    Creatinine 0.82 0.51 - 0.95 mg/dL    GFR Estimate >90 >60 mL/min/1.73m2    Calcium 8.9 8.6 - 10.0 mg/dL    Chloride 105 98 - 107 mmol/L    Glucose 96 70 - 99 mg/dL    Alkaline Phosphatase 77 40 - 150 U/L    AST 35 0 - 45 U/L    ALT 27 0 - 50 U/L    Protein Total 6.9 6.4 - 8.3 g/dL    Albumin 4.0 3.5 - 5.2 g/dL    Bilirubin Total 0.3 <=1.2 mg/dL   HCG qualitative pregnancy (blood)     Status: Normal   Result Value Ref Range    hCG Serum Qualitative Negative Negative   CBC with platelets and differential     Status: None   Result Value Ref Range    WBC Count 9.9 4.0 - 11.0 10e3/uL    RBC Count 4.79 3.80 - 5.20 10e6/uL     Hemoglobin 14.6 11.7 - 15.7 g/dL    Hematocrit 43.1 35.0 - 47.0 %    MCV 90 78 - 100 fL    MCH 30.5 26.5 - 33.0 pg    MCHC 33.9 31.5 - 36.5 g/dL    RDW 12.0 10.0 - 15.0 %    Platelet Count 308 150 - 450 10e3/uL    % Neutrophils 76 %    % Lymphocytes 19 %    % Monocytes 4 %    % Eosinophils 1 %    % Basophils 0 %    % Immature Granulocytes 0 %    NRBCs per 100 WBC 0 <1 /100    Absolute Neutrophils 7.5 1.6 - 8.3 10e3/uL    Absolute Lymphocytes 1.9 0.8 - 5.3 10e3/uL    Absolute Monocytes 0.4 0.0 - 1.3 10e3/uL    Absolute Eosinophils 0.1 0.0 - 0.7 10e3/uL    Absolute Basophils 0.0 0.0 - 0.2 10e3/uL    Absolute Immature Granulocytes 0.0 <=0.4 10e3/uL    Absolute NRBCs 0.0 10e3/uL   EKG 12 lead     Status: None (Preliminary result)   Result Value Ref Range    Systolic Blood Pressure  mmHg    Diastolic Blood Pressure  mmHg    Ventricular Rate 80 BPM    Atrial Rate 80 BPM    NE Interval 138 ms    QRS Duration 88 ms     ms    QTc 452 ms    P Axis 12 degrees    R AXIS 0 degrees    T Axis 20 degrees    Interpretation ECG Sinus rhythm with sinus arrhythmia  Normal ECG      CBC with platelets differential     Status: None    Narrative    The following orders were created for panel order CBC with platelets differential.  Procedure                               Abnormality         Status                     ---------                               -----------         ------                     CBC with platelets and d...[[id number]]                      Final result                 Please view results for these tests on the individual orders.     Medications   sodium chloride 0.9 % infusion (  Canceled Entry 6/10/24 7236)   sodium chloride 0.9% BOLUS 500 mL (500 mLs Intravenous $New Bag 6/10/24 1319)   diphenhydrAMINE (BENADRYL) injection 25 mg (25 mg Intravenous $Given 6/10/24 1322)   prochlorperazine (COMPAZINE) injection 10 mg (10 mg Intravenous $Given 6/10/24 1319)   acetaminophen (TYLENOL) tablet 1,000 mg (1,000 mg Oral  "$Given 6/10/24 2669)     Labs Ordered and Resulted from Time of ED Arrival to Time of ED Departure   COMPREHENSIVE METABOLIC PANEL - Abnormal       Result Value    Sodium 136      Potassium 4.9      Carbon Dioxide (CO2) 21 (*)     Anion Gap 10      Urea Nitrogen 7.2      Creatinine 0.82      GFR Estimate >90      Calcium 8.9      Chloride 105      Glucose 96      Alkaline Phosphatase 77      AST 35      ALT 27      Protein Total 6.9      Albumin 4.0      Bilirubin Total 0.3     HCG QUALITATIVE PREGNANCY - Normal    hCG Serum Qualitative Negative     CBC WITH PLATELETS AND DIFFERENTIAL    WBC Count 9.9      RBC Count 4.79      Hemoglobin 14.6      Hematocrit 43.1      MCV 90      MCH 30.5      MCHC 33.9      RDW 12.0      Platelet Count 308      % Neutrophils 76      % Lymphocytes 19      % Monocytes 4      % Eosinophils 1      % Basophils 0      % Immature Granulocytes 0      NRBCs per 100 WBC 0      Absolute Neutrophils 7.5      Absolute Lymphocytes 1.9      Absolute Monocytes 0.4      Absolute Eosinophils 0.1      Absolute Basophils 0.0      Absolute Immature Granulocytes 0.0      Absolute NRBCs 0.0     TROPONIN T, HIGH SENSITIVITY     No orders to display          Critical care was not performed.     Medical Decision Making  The patient's presentation was of moderate complexity (a chronic illness mild to moderate exacerbation, progression, or side effect of treatment).    The patient's evaluation involved:  review of external note(s) from 3+ sources (clinical)  review of 3+ test result(s) ordered prior to this encounter (see separate area of note for details)  ordering and/or review of 1 test(s) in this encounter (EKG)    The patient's management necessitated high risk (a decision regarding hospitalization).    Assessment & Plan    27yo F pmht HTN p/w symptomatic HTN.  Mild HA, diffuse \"tingling,\" dizziness, and feeling shaking x this morning.  BPs at home 150s to 160s SBP.  History of gestational diabetes seem " to persist after her pregnancy and postpartum period.  She has previously been prescribed lisinopril, but has not seen a PCP 2/2 insurance issues.  Has been taking lisinopril which was refilled in the ED after presentation for very similar symptoms.    At present, patient is mildly hypertensive (151/95), but otherwise vitals are unremarkable.  She is in NAD.  She is neurologically intact.  She has an unremarkable physical exam. EKG sinus rhythm unchanged from prior.    Given patient's symptomatic HTN, CMP, CBC,  pregnancy were sent to assess for endorgan damage and negative. Given her constellation of symptoms being consistent with a prior ED presentation in which she had good improvement with Benadryl and Compazine, she received those as well as IVF here with resolution of her symptoms. We had planned for troponin, but this clotted, and as patient was feeling improved she requested to be discharged without redraw.  Understood potential for cardiac injury, though low suspicion.    Low suspicion intracranial bleed or mass, venous sinus thrombosis, meningitis, ACS, central neuro process.      Patient discharged with information for PCP follow-up, ER return precautions for worsening symptoms, and instructions to continue her lisinopril.    I have reviewed the nursing notes. I have reviewed the findings, diagnosis, plan and need for follow up with the patient.    New Prescriptions    No medications on file       Final diagnoses:   Hypertension         Riley Rossi PA-C  [facility] EMERGENCY DEPARTMENT  6/10/2024     Riley Rossi PA-C  06/10/24 1421       Riley Rossi PA-C  06/10/24 1422      --    ED Attending Physician Attestation    I Joanie Trimble MD, MD, cared for this patient with the Advanced Practice Provider (ARI). I personally provided a substantive portion of the care for this patient, including approving the care plan for the number and complexity of problems addressed and taking  responsibility related to the risk of complications and/or morbidity or mortality of patient management. Please see the ARI's documentation for full details.    Summary of HPI, PE, ED Course   Patient is a 28 year old female evaluated in the emergency department for dizziness with high BP. Exam and ED course notable for labs ok, improving with meds. After the completion of care in the emergency department, the patient was discharged.      Joanie Trimble MD, MD  Emergency Medicine       Joanie Trimble MD  06/10/24 6626

## 2024-09-15 ENCOUNTER — HEALTH MAINTENANCE LETTER (OUTPATIENT)
Age: 29
End: 2024-09-15